# Patient Record
Sex: FEMALE | HISPANIC OR LATINO | Employment: FULL TIME | URBAN - METROPOLITAN AREA
[De-identification: names, ages, dates, MRNs, and addresses within clinical notes are randomized per-mention and may not be internally consistent; named-entity substitution may affect disease eponyms.]

---

## 2024-01-15 ENCOUNTER — APPOINTMENT (OUTPATIENT)
Dept: GENERAL RADIOLOGY | Facility: CLINIC | Age: 49
End: 2024-01-15
Attending: EMERGENCY MEDICINE
Payer: COMMERCIAL

## 2024-01-15 ENCOUNTER — HOSPITAL ENCOUNTER (EMERGENCY)
Facility: CLINIC | Age: 49
Discharge: HOME | End: 2024-01-15
Attending: EMERGENCY MEDICINE | Admitting: EMERGENCY MEDICINE
Payer: COMMERCIAL

## 2024-01-15 VITALS
HEIGHT: 62 IN | DIASTOLIC BLOOD PRESSURE: 83 MMHG | RESPIRATION RATE: 18 BRPM | BODY MASS INDEX: 37.54 KG/M2 | SYSTOLIC BLOOD PRESSURE: 131 MMHG | HEART RATE: 88 BPM | TEMPERATURE: 98 F | WEIGHT: 204 LBS | OXYGEN SATURATION: 98 %

## 2024-01-15 DIAGNOSIS — R07.9 CHEST PAIN, UNSPECIFIED TYPE: ICD-10-CM

## 2024-01-15 LAB
ALBUMIN SERPL BCG-MCNC: 4.5 G/DL (ref 3.5–5.2)
ALP SERPL-CCNC: 67 U/L (ref 40–150)
ALT SERPL W P-5'-P-CCNC: 18 U/L (ref 0–50)
ANION GAP SERPL CALCULATED.3IONS-SCNC: 9 MMOL/L (ref 7–15)
AST SERPL W P-5'-P-CCNC: 20 U/L (ref 0–45)
ATRIAL RATE - MUSE: 73 BPM
BASOPHILS # BLD AUTO: 0 10E3/UL (ref 0–0.2)
BASOPHILS NFR BLD AUTO: 0 %
BILIRUB SERPL-MCNC: 0.4 MG/DL
BUN SERPL-MCNC: 9.9 MG/DL (ref 6–20)
CALCIUM SERPL-MCNC: 9.8 MG/DL (ref 8.6–10)
CHLORIDE SERPL-SCNC: 105 MMOL/L (ref 98–107)
CREAT SERPL-MCNC: 0.65 MG/DL (ref 0.51–0.95)
D DIMER PPP FEU-MCNC: 0.43 UG/ML FEU (ref 0–0.5)
DEPRECATED HCO3 PLAS-SCNC: 25 MMOL/L (ref 22–29)
DIASTOLIC BLOOD PRESSURE - MUSE: NORMAL MMHG
EGFRCR SERPLBLD CKD-EPI 2021: >90 ML/MIN/1.73M2
EOSINOPHIL # BLD AUTO: 0.2 10E3/UL (ref 0–0.7)
EOSINOPHIL NFR BLD AUTO: 2 %
ERYTHROCYTE [DISTWIDTH] IN BLOOD BY AUTOMATED COUNT: 14.3 % (ref 10–15)
FLUAV RNA SPEC QL NAA+PROBE: NEGATIVE
FLUBV RNA RESP QL NAA+PROBE: NEGATIVE
GLUCOSE SERPL-MCNC: 108 MG/DL (ref 70–99)
HCT VFR BLD AUTO: 40.6 % (ref 35–47)
HGB BLD-MCNC: 13.1 G/DL (ref 11.7–15.7)
IMM GRANULOCYTES # BLD: 0 10E3/UL
IMM GRANULOCYTES NFR BLD: 0 %
INTERPRETATION ECG - MUSE: NORMAL
LYMPHOCYTES # BLD AUTO: 2.7 10E3/UL (ref 0.8–5.3)
LYMPHOCYTES NFR BLD AUTO: 33 %
MCH RBC QN AUTO: 28.3 PG (ref 26.5–33)
MCHC RBC AUTO-ENTMCNC: 32.3 G/DL (ref 31.5–36.5)
MCV RBC AUTO: 88 FL (ref 78–100)
MONOCYTES # BLD AUTO: 0.8 10E3/UL (ref 0–1.3)
MONOCYTES NFR BLD AUTO: 9 %
NEUTROPHILS # BLD AUTO: 4.7 10E3/UL (ref 1.6–8.3)
NEUTROPHILS NFR BLD AUTO: 56 %
NRBC # BLD AUTO: 0 10E3/UL
NRBC BLD AUTO-RTO: 0 /100
P AXIS - MUSE: 52 DEGREES
PLATELET # BLD AUTO: 363 10E3/UL (ref 150–450)
POTASSIUM SERPL-SCNC: 4 MMOL/L (ref 3.4–5.3)
PR INTERVAL - MUSE: 130 MS
PROT SERPL-MCNC: 7.6 G/DL (ref 6.4–8.3)
QRS DURATION - MUSE: 78 MS
QT - MUSE: 362 MS
QTC - MUSE: 398 MS
R AXIS - MUSE: 42 DEGREES
RBC # BLD AUTO: 4.63 10E6/UL (ref 3.8–5.2)
RSV RNA SPEC NAA+PROBE: NEGATIVE
SARS-COV-2 RNA RESP QL NAA+PROBE: NEGATIVE
SODIUM SERPL-SCNC: 139 MMOL/L (ref 135–145)
SYSTOLIC BLOOD PRESSURE - MUSE: NORMAL MMHG
T AXIS - MUSE: 39 DEGREES
TROPONIN T SERPL HS-MCNC: <6 NG/L
VENTRICULAR RATE- MUSE: 73 BPM
WBC # BLD AUTO: 8.3 10E3/UL (ref 4–11)

## 2024-01-15 PROCEDURE — 87637 SARSCOV2&INF A&B&RSV AMP PRB: CPT | Performed by: EMERGENCY MEDICINE

## 2024-01-15 PROCEDURE — 99285 EMERGENCY DEPT VISIT HI MDM: CPT | Mod: 25 | Performed by: EMERGENCY MEDICINE

## 2024-01-15 PROCEDURE — 85379 FIBRIN DEGRADATION QUANT: CPT | Performed by: EMERGENCY MEDICINE

## 2024-01-15 PROCEDURE — 99284 EMERGENCY DEPT VISIT MOD MDM: CPT | Mod: 25 | Performed by: EMERGENCY MEDICINE

## 2024-01-15 PROCEDURE — 93010 ELECTROCARDIOGRAM REPORT: CPT | Performed by: EMERGENCY MEDICINE

## 2024-01-15 PROCEDURE — 80053 COMPREHEN METABOLIC PANEL: CPT | Performed by: EMERGENCY MEDICINE

## 2024-01-15 PROCEDURE — 250N000013 HC RX MED GY IP 250 OP 250 PS 637: Performed by: EMERGENCY MEDICINE

## 2024-01-15 PROCEDURE — 84484 ASSAY OF TROPONIN QUANT: CPT | Performed by: EMERGENCY MEDICINE

## 2024-01-15 PROCEDURE — 85025 COMPLETE CBC W/AUTO DIFF WBC: CPT | Performed by: EMERGENCY MEDICINE

## 2024-01-15 PROCEDURE — 93005 ELECTROCARDIOGRAM TRACING: CPT | Performed by: EMERGENCY MEDICINE

## 2024-01-15 PROCEDURE — 71046 X-RAY EXAM CHEST 2 VIEWS: CPT | Mod: 26 | Performed by: RADIOLOGY

## 2024-01-15 PROCEDURE — 36415 COLL VENOUS BLD VENIPUNCTURE: CPT | Performed by: EMERGENCY MEDICINE

## 2024-01-15 PROCEDURE — 71046 X-RAY EXAM CHEST 2 VIEWS: CPT

## 2024-01-15 RX ORDER — IBUPROFEN 600 MG/1
600 TABLET, FILM COATED ORAL ONCE
Status: COMPLETED | OUTPATIENT
Start: 2024-01-15 | End: 2024-01-15

## 2024-01-15 RX ORDER — FERROUS SULFATE 325(65) MG
325 TABLET ORAL
COMMUNITY

## 2024-01-15 RX ADMIN — IBUPROFEN 600 MG: 600 TABLET, FILM COATED ORAL at 03:51

## 2024-01-15 ASSESSMENT — ACTIVITIES OF DAILY LIVING (ADL): ADLS_ACUITY_SCORE: 35

## 2024-01-15 NOTE — DISCHARGE INSTRUCTIONS
Follow up with your clinic doctor this week. You can try ibuprofen for pain. Return with any new or worsening symptoms or any other concerns.

## 2024-01-15 NOTE — ED TRIAGE NOTES
Patient states she has chest pain since yesterday and right shoulder pain. Chest pain has been since yesterday morning 0100. Chest pain is like a heavy weight on her chest. She has had chest pain before that went away but it has not been as intense as this. This is worse when she lies down and she is not able to sleep.  VSS

## 2024-01-15 NOTE — LETTER
January 15, 2024      To Whom It May Concern:      Nathan Lisa was seen in our Emergency Department today, 01/15/24.  I expect her condition to improve over the next 1 day.  She may return to work when improved.    Sincerely,        Liss Muñiz MD

## 2024-01-15 NOTE — ED PROVIDER NOTES
"ED Provider Note  [facility]      History     Chief Complaint   Patient presents with    Chest Pain     HPI  Nathan Lisa is a 48 year old female with history of anemia who presents to the ED with complaint of chest pain for the last 25 hours.  She states started around 1 AM last night, while lying in bed.  She states that it is in the center and right side of her chest, feels like pressure.  No true shortness of breath, but she states that it feels much worse when she takes deep breaths, so try not to do that.  No fever, stuffy nose, sore throat, cough, abdominal pain, nausea, vomiting of diarrhea.  No history of DVT or PE, recent travel, surgery, bedrest.  No calf pain or swelling, but she states she has chronic left foot swelling for many years.  She states she has had chest pain occasionally in the past, but never anything that is lasted this long.  She is a non-smoker and denies previous family history of cardiac disease.    Past Medical History  No past medical history on file.  No past surgical history on file.  ferrous sulfate (FEROSUL) 325 (65 Fe) MG tablet      Allergies   Allergen Reactions    Latex Rash     Added based on information entered during case entry, please review and add reactions, type, and severity as needed    Nickel Rash     Family History  No family history on file.  Social History          A medically appropriate review of systems was performed with pertinent positives and negatives noted in the HPI, and all other systems negative.    Physical Exam   BP: 139/85  Pulse: 88  Temp: 98  F (36.7  C)  Resp: 18  Height: 157.5 cm (5' 2\")  Weight: 92.5 kg (204 lb)  SpO2: 100 %  Physical Exam  Constitutional:       General: She is not in acute distress.     Appearance: Normal appearance. She is not toxic-appearing.   HENT:      Head: Atraumatic.      Mouth/Throat:      Mouth: Mucous membranes are moist.   Eyes:      General: No scleral icterus.     " Conjunctiva/sclera: Conjunctivae normal.   Cardiovascular:      Rate and Rhythm: Normal rate.      Heart sounds: Normal heart sounds.   Pulmonary:      Effort: No respiratory distress.      Breath sounds: Normal breath sounds.   Abdominal:      Palpations: Abdomen is soft.      Tenderness: There is no abdominal tenderness.   Musculoskeletal:         General: No deformity.      Cervical back: Neck supple.   Skin:     General: Skin is warm.      Findings: No rash.   Neurological:      Mental Status: She is alert.           ED Course, Procedures, & Data      Procedures            EKG Interpretation:      Interpreted by Liss Muñiz MD  Time reviewed: 0200  Symptoms at time of EKG: chest pain   Rhythm: normal sinus   Rate: normal  Axis: normal  Ectopy: none  Conduction: normal  ST Segments/ T Waves: No ST-T wave changes  Q Waves: none  Comparison to prior: No old EKG available    Clinical Impression: normal EKG                 Results for orders placed or performed during the hospital encounter of 01/15/24   Chest XR,  PA & LAT     Status: None    Narrative    EXAM: XR CHEST 2 VIEWS  LOCATION: [facility]  DATE: 1/15/2024    INDICATION: sob  COMPARISON: None.      Impression    IMPRESSION: Negative chest.   Amelia Draw *Canceled*     Status: None ()    Narrative    The following orders were created for panel order [facility]elia Draw.  Procedure                               Abnormality         Status                     ---------                               -----------         ------                       Please view results for these tests on the individual orders.   Comprehensive metabolic panel     Status: Abnormal   Result Value Ref Range    Sodium 139 135 - 145 mmol/L    Potassium 4.0 3.4 - 5.3 mmol/L    Carbon Dioxide (CO2) 25 22 - 29 mmol/L    Anion Gap 9 7 - 15 mmol/L    Urea Nitrogen 9.9 6.0 - 20.0 mg/dL    Creatinine 0.65 0.51 - 0.95 mg/dL    GFR Estimate >90  >60 mL/min/1.73m2    Calcium 9.8 8.6 - 10.0 mg/dL    Chloride 105 98 - 107 mmol/L    Glucose 108 (H) 70 - 99 mg/dL    Alkaline Phosphatase 67 40 - 150 U/L    AST 20 0 - 45 U/L    ALT 18 0 - 50 U/L    Protein Total 7.6 6.4 - 8.3 g/dL    Albumin 4.5 3.5 - 5.2 g/dL    Bilirubin Total 0.4 <=1.2 mg/dL   Troponin T, High Sensitivity     Status: Normal   Result Value Ref Range    Troponin T, High Sensitivity <6 <=14 ng/L   Symptomatic Influenza A/B, RSV, & SARS-CoV2 PCR (COVID-19) Nose     Status: Normal    Specimen: Nose; Swab   Result Value Ref Range    Influenza A PCR Negative Negative    Influenza B PCR Negative Negative    RSV PCR Negative Negative    SARS CoV2 PCR Negative Negative    Narrative    Testing was performed using the Xpert Xpress CoV2/Flu/RSV Assay on the Pro[employer]X GeneXpert Instrument. This test should be ordered for the detection of SARS-CoV-2, influenza, and RSV viruses in individuals who meet clinical and/or epidemiological criteria. Test performance is unknown in asymptomatic patients. This test is for in vitro diagnostic use under the FDA EUA for laboratories certified under CLIA to perform high or moderate complexity testing. This test has not been FDA cleared or approved. A negative result does not rule out the presence of PCR inhibitors in the specimen or target RNA in concentration below the limit of detection for the assay. If only one viral target is positive but coinfection with multiple targets is suspected, the sample should be re-tested with another FDA cleared, approved, or authorized test, if coinfection would change clinical management. This test was validated by the [facility]. These laboratories are certified under the Clinical Laboratory Improvement Amendments of 1988 (CLIA-88) as qualified to perform high complexity laboratory testing.   D dimer quantitative     Status: Normal   Result Value Ref Range    D-Dimer Quantitative 0.43 0.00 - 0.50 ug/mL FEU    Narrative     This D-dimer assay is intended for use in conjunction with a clinical pretest probability assessment model to exclude pulmonary embolism (PE) and deep venous thrombosis (DVT) in outpatients suspected of PE or DVT. The cut-off value is 0.50 ug/mL FEU.    For patients 50 years of age or older, the application of age-adjusted cut-off values for D-Dimer may increase the specificity without significant effect on sensitivity. The literature suggested calculation age adjusted cut-off in ug/L = age in years x 10 ug/L. The results in this laboratory are reported as ug/mL rather than ug/L. The calculation for age adjusted cut off in ug/mL= age in years x 0.01 ug/mL. For example, the cut off for a 76 year old male is 76 x 0.01 ug/mL = 0.76 ug/mL (760 ug/L).    M Georgina et al. Age adjusted D-dimer cut-off levels to rule out pulmonary embolism: The ADJUST-PE Study. OLI 2014;311:7344-5507.; HJ Lavon et al. Diagnostic accuracy of conventional or age adjusted D-dimer cutoff values in older patients with suspected venous thromboembolism. Systemic review and meta-analysis. BMJ 2013:346:f2492.   CBC with platelets and differential     Status: None   Result Value Ref Range    WBC Count 8.3 4.0 - 11.0 10e3/uL    RBC Count 4.63 3.80 - 5.20 10e6/uL    Hemoglobin 13.1 11.7 - 15.7 g/dL    Hematocrit 40.6 35.0 - 47.0 %    MCV 88 78 - 100 fL    MCH 28.3 26.5 - 33.0 pg    MCHC 32.3 31.5 - 36.5 g/dL    RDW 14.3 10.0 - 15.0 %    Platelet Count 363 150 - 450 10e3/uL    % Neutrophils 56 %    % Lymphocytes 33 %    % Monocytes 9 %    % Eosinophils 2 %    % Basophils 0 %    % Immature Granulocytes 0 %    NRBCs per 100 WBC 0 <1 /100    Absolute Neutrophils 4.7 1.6 - 8.3 10e3/uL    Absolute Lymphocytes 2.7 0.8 - 5.3 10e3/uL    Absolute Monocytes 0.8 0.0 - 1.3 10e3/uL    Absolute Eosinophils 0.2 0.0 - 0.7 10e3/uL    Absolute Basophils 0.0 0.0 - 0.2 10e3/uL    Absolute Immature Granulocytes 0.0 <=0.4 10e3/uL    Absolute NRBCs 0.0 10e3/uL   EKG  12-lead, tracing only     Status: None (Preliminary result)   Result Value Ref Range    Systolic Blood Pressure  mmHg    Diastolic Blood Pressure  mmHg    Ventricular Rate 73 BPM    Atrial Rate 73 BPM    PA Interval 130 ms    QRS Duration 78 ms     ms    QTc 398 ms    P Axis 52 degrees    R AXIS 42 degrees    T Axis 39 degrees    Interpretation ECG Sinus rhythm  Normal ECG      CBC with platelets differential     Status: None    Narrative    The following orders were created for panel order CBC with platelets differential.  Procedure                               Abnormality         Status                     ---------                               -----------         ------                     CBC with platelets and d...[[id number]]                      Final result                 Please view results for these tests on the individual orders.     Medications   ibuprofen (ADVIL/MOTRIN) tablet 600 mg (600 mg Oral $Given 1/15/24 4376)     Labs Ordered and Resulted from Time of ED Arrival to Time of ED Departure   COMPREHENSIVE METABOLIC PANEL - Abnormal       Result Value    Sodium 139      Potassium 4.0      Carbon Dioxide (CO2) 25      Anion Gap 9      Urea Nitrogen 9.9      Creatinine 0.65      GFR Estimate >90      Calcium 9.8      Chloride 105      Glucose 108 (*)     Alkaline Phosphatase 67      AST 20      ALT 18      Protein Total 7.6      Albumin 4.5      Bilirubin Total 0.4     TROPONIN T, HIGH SENSITIVITY - Normal    Troponin T, High Sensitivity <6     INFLUENZA A/B, RSV, & SARS-COV2 PCR - Normal    Influenza A PCR Negative      Influenza B PCR Negative      RSV PCR Negative      SARS CoV2 PCR Negative     D DIMER QUANTITATIVE - Normal    D-Dimer Quantitative 0.43     CBC WITH PLATELETS AND DIFFERENTIAL    WBC Count 8.3      RBC Count 4.63      Hemoglobin 13.1      Hematocrit 40.6      MCV 88      MCH 28.3      MCHC 32.3      RDW 14.3      Platelet Count 363      % Neutrophils 56      % Lymphocytes 33       % Monocytes 9      % Eosinophils 2      % Basophils 0      % Immature Granulocytes 0      NRBCs per 100 WBC 0      Absolute Neutrophils 4.7      Absolute Lymphocytes 2.7      Absolute Monocytes 0.8      Absolute Eosinophils 0.2      Absolute Basophils 0.0      Absolute Immature Granulocytes 0.0      Absolute NRBCs 0.0       Chest XR,  PA & LAT   Final Result   IMPRESSION: Negative chest.             Critical care was not performed.     Medical Decision Making  The patient's presentation was of moderate complexity (an acute illness with systemic symptoms).    The patient's evaluation involved:  ordering and/or review of 3+ test(s) in this encounter (see separate area of note for details)    The patient's management necessitated only low risk treatment.    Assessment & Plan    EKG was done and was unremarkable.  Troponin was less than 6-thus my concern for acute coronary syndrome is very low.  D-dimer was negative at 0.43, the patient presented with O2 saturations of 100%, was not at all tachycardic.  She does not take any hormonal medications, has no notable DVT or PE risk factors.  My concern for PE is quite low.  Basic labs are otherwise not remarkable.  Chest x-ray was negative.  The cause for the patient's symptoms are unclear, the no my concern for serious cause for symptoms is low.  I have low suspicion for dissection given the nature of her pain, the fact that it does not seem severe.  Although D-dimer does not exclude the possibility of dissection, it is unlikely she has a dissection with a normal D-dimer.  I do think she is safe for discharge home.  I encouraged her to try Tylenol or ibuprofen to see if this helps her symptoms, follow-up with her outpatient provider this week.  She is encouraged to return to the ER with any new or worsening symptoms, any other concerns.  She verbalizes understanding and is agreeable to the plan.    Dictation Disclaimer: Some of this Note has been completed with  voice-recognition dictation software. Although errors are generally corrected real-time, there is the potential for a rare error to be present in the completed chart.      I have reviewed the nursing notes. I have reviewed the findings, diagnosis, plan and need for follow up with the patient.    Discharge Medication List as of 1/15/2024  3:48 AM          Final diagnoses:   Chest pain, unspecified type       Liss Muñiz  [facility] EMERGENCY DEPARTMENT  1/15/2024     Liss Muñiz MD  01/15/24 0724